# Patient Record
Sex: MALE | Race: BLACK OR AFRICAN AMERICAN | URBAN - METROPOLITAN AREA
[De-identification: names, ages, dates, MRNs, and addresses within clinical notes are randomized per-mention and may not be internally consistent; named-entity substitution may affect disease eponyms.]

---

## 2017-11-22 ENCOUNTER — EMERGENCY (EMERGENCY)
Facility: HOSPITAL | Age: 22
LOS: 1 days | End: 2017-11-22
Attending: EMERGENCY MEDICINE
Payer: SELF-PAY

## 2017-11-22 VITALS
OXYGEN SATURATION: 97 % | DIASTOLIC BLOOD PRESSURE: 86 MMHG | HEART RATE: 88 BPM | SYSTOLIC BLOOD PRESSURE: 140 MMHG | RESPIRATION RATE: 16 BRPM

## 2017-11-22 PROCEDURE — 99285 EMERGENCY DEPT VISIT HI MDM: CPT | Mod: 25

## 2017-11-23 VITALS
HEART RATE: 75 BPM | DIASTOLIC BLOOD PRESSURE: 69 MMHG | TEMPERATURE: 98 F | SYSTOLIC BLOOD PRESSURE: 107 MMHG | OXYGEN SATURATION: 100 % | RESPIRATION RATE: 18 BRPM

## 2017-11-23 PROCEDURE — 90471 IMMUNIZATION ADMIN: CPT

## 2017-11-23 PROCEDURE — 70450 CT HEAD/BRAIN W/O DYE: CPT | Mod: 26

## 2017-11-23 PROCEDURE — G0378: CPT

## 2017-11-23 PROCEDURE — 72125 CT NECK SPINE W/O DYE: CPT | Mod: 26

## 2017-11-23 PROCEDURE — 70486 CT MAXILLOFACIAL W/O DYE: CPT | Mod: 26

## 2017-11-23 PROCEDURE — 99284 EMERGENCY DEPT VISIT MOD MDM: CPT | Mod: 25

## 2017-11-23 PROCEDURE — 70450 CT HEAD/BRAIN W/O DYE: CPT

## 2017-11-23 PROCEDURE — 93005 ELECTROCARDIOGRAM TRACING: CPT

## 2017-11-23 PROCEDURE — 99236 HOSP IP/OBS SAME DATE HI 85: CPT | Mod: 25

## 2017-11-23 PROCEDURE — 90715 TDAP VACCINE 7 YRS/> IM: CPT

## 2017-11-23 PROCEDURE — 70486 CT MAXILLOFACIAL W/O DYE: CPT

## 2017-11-23 PROCEDURE — 72125 CT NECK SPINE W/O DYE: CPT

## 2017-11-23 RX ORDER — CYCLOPENTOLATE HYDROCHLORIDE 10 MG/ML
1 SOLUTION/ DROPS OPHTHALMIC
Qty: 1 | Refills: 0 | OUTPATIENT
Start: 2017-11-23

## 2017-11-23 RX ORDER — OFLOXACIN 0.3 %
1 DROPS OPHTHALMIC (EYE) ONCE
Qty: 0 | Refills: 0 | Status: COMPLETED | OUTPATIENT
Start: 2017-11-23 | End: 2017-11-23

## 2017-11-23 RX ORDER — TETANUS TOXOID, REDUCED DIPHTHERIA TOXOID AND ACELLULAR PERTUSSIS VACCINE, ADSORBED 5; 2.5; 8; 8; 2.5 [IU]/.5ML; [IU]/.5ML; UG/.5ML; UG/.5ML; UG/.5ML
0.5 SUSPENSION INTRAMUSCULAR ONCE
Qty: 0 | Refills: 0 | Status: COMPLETED | OUTPATIENT
Start: 2017-11-23 | End: 2017-11-23

## 2017-11-23 RX ORDER — FLUORESCEIN SODIUM 9 MG
1 STRIP OPHTHALMIC (EYE) ONCE
Qty: 0 | Refills: 0 | Status: COMPLETED | OUTPATIENT
Start: 2017-11-23 | End: 2017-11-23

## 2017-11-23 RX ORDER — PREDNISOLONE SODIUM PHOSPHATE 1 %
1 DROPS OPHTHALMIC (EYE)
Qty: 1 | Refills: 0 | OUTPATIENT
Start: 2017-11-23 | End: 2017-11-25

## 2017-11-23 RX ORDER — ERYTHROMYCIN BASE 5 MG/GRAM
1 OINTMENT (GRAM) OPHTHALMIC (EYE)
Qty: 3 | Refills: 0 | OUTPATIENT
Start: 2017-11-23 | End: 2017-12-03

## 2017-11-23 RX ORDER — OFLOXACIN 0.3 %
1 DROPS OPHTHALMIC (EYE)
Qty: 1 | Refills: 0 | OUTPATIENT
Start: 2017-11-23 | End: 2017-11-28

## 2017-11-23 RX ORDER — OXYCODONE HYDROCHLORIDE 5 MG/1
5 TABLET ORAL ONCE
Qty: 0 | Refills: 0 | Status: DISCONTINUED | OUTPATIENT
Start: 2017-11-23 | End: 2017-11-23

## 2017-11-23 RX ADMIN — TETANUS TOXOID, REDUCED DIPHTHERIA TOXOID AND ACELLULAR PERTUSSIS VACCINE, ADSORBED 0.5 MILLILITER(S): 5; 2.5; 8; 8; 2.5 SUSPENSION INTRAMUSCULAR at 00:33

## 2017-11-23 RX ADMIN — Medication 1 APPLICATION(S): at 03:50

## 2017-11-23 RX ADMIN — OXYCODONE HYDROCHLORIDE 5 MILLIGRAM(S): 5 TABLET ORAL at 01:30

## 2017-11-23 RX ADMIN — OXYCODONE HYDROCHLORIDE 5 MILLIGRAM(S): 5 TABLET ORAL at 00:33

## 2017-11-23 RX ADMIN — Medication 1 DROP(S): at 03:50

## 2017-11-23 RX ADMIN — Medication 1 DROP(S): at 08:40
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
## 2017-11-23 NOTE — ED CDU PROVIDER DISPOSITION NOTE - PLAN OF CARE
1. Follow up with your PMD within 48-72 hours.  2. Follow up with our optho clinic at [phone] tomorrow at 10am at [address] Antibiotics eye drops and other drops as prescribed.  Cold compresses to eye for pain.  NO Aspirin or NSAIDS. Head of the bed elevated. No heavy lifting or sneezing.   3. Worsening pain, new fever, chills, vision changes return to ER.
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
## 2017-11-23 NOTE — ED PROVIDER NOTE - NS ED ROS FT
Constitutional: no fever  Eyes: + conjunctivitis +eyelid swelling  Ears: no ear pain   Nose: no nasal congestion, Mouth/Throat: no throat pain, Neck: no stiffness  Cardiovascular: no chest pain  Chest: no cough  Gastrointestinal: no abdominal pain, no vomiting and diarrhea  MSK: no joint pain, +face pain   : no dysuria  Skin: no rash, +bruising, +laceration  Neuro: no LOC
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
## 2017-11-23 NOTE — ED CDU PROVIDER INITIAL DAY NOTE - PROGRESS NOTE DETAILS
Pt resting comfortably. NAD. VSS, reports b/l eye pain and burning. alondra lens placed with 1L each NS irrigation active. -DAHLIA Rivas Pt resting comfortably. NAD. No complaints. VSS. Pt seen by ophtho pending full evaluation. no complaints; repeat pH 7 and per ophtho no further irrigation. ROCKY Rivas pt seen and evaluated by ophtho and spoke to pt via  to follow up at [facility] tomorrow at 10am and close follow up with PMD for sickle cell workup outpt. Plan discussed with pt, family and Dr. Bragg. ROCKY Rivas
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
## 2017-11-23 NOTE — ED ADULT NURSE REASSESSMENT NOTE - NS ED NURSE REASSESS COMMENT FT1
12 Noon  pt  had opthalmic eval . Continue to monitor  16.00 HRs  Pt was reevaluated by  opthalmic team . Pt was taken back To Eye room . Pt can go home as per opthalmic team .  1700 Pt is Reevaluated by Dr Yemi Coulter Pt is Discharged . Vital signs stable Pt had no N/V/D Fever chills  CP during his stay in ED  Pt is ambulatory stable to go home
Pt continuos to have burning sensation in the Bilateral eyes with redness unable to see the light Bilateral eye irrigations given By DAHLIA Xiong Pt  tolerated the procedure well pt is cleaned  Comfort  measures provided  safety measures initiated  continue to monitor
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
## 2017-11-23 NOTE — ED PROVIDER NOTE - PHYSICAL EXAMINATION
skin- bruising and swelling appreciated over bilateral eyelids, 1 cm laceration over the left upper lip with no active exsanguination; no tenderness appreciated over facial bones

## 2017-11-24 ENCOUNTER — APPOINTMENT (OUTPATIENT)
Dept: OPHTHALMOLOGY | Facility: CLINIC | Age: 22
End: 2017-11-24

## 2017-11-24 PROBLEM — Z00.00 ENCOUNTER FOR PREVENTIVE HEALTH EXAMINATION: Status: ACTIVE | Noted: 2017-11-24

## 2017-11-27 ENCOUNTER — APPOINTMENT (OUTPATIENT)
Dept: OPHTHALMOLOGY | Facility: CLINIC | Age: 22
End: 2017-11-27

## 2017-11-28 ENCOUNTER — APPOINTMENT (OUTPATIENT)
Dept: OPHTHALMOLOGY | Facility: CLINIC | Age: 22
End: 2017-11-28

## 2017-11-30 ENCOUNTER — APPOINTMENT (OUTPATIENT)
Dept: OPHTHALMOLOGY | Facility: CLINIC | Age: 22
End: 2017-11-30

## 2018-11-18 ENCOUNTER — EMERGENCY (EMERGENCY)
Facility: HOSPITAL | Age: 23
LOS: 1 days | Discharge: HOME | End: 2018-11-18
Attending: EMERGENCY MEDICINE
Payer: SELF-PAY

## 2018-11-18 VITALS
RESPIRATION RATE: 16 BRPM | HEIGHT: 70 IN | TEMPERATURE: 98 F | DIASTOLIC BLOOD PRESSURE: 70 MMHG | HEART RATE: 88 BPM | SYSTOLIC BLOOD PRESSURE: 136 MMHG | WEIGHT: 179.9 LBS

## 2018-11-18 PROCEDURE — 99283 EMERGENCY DEPT VISIT LOW MDM: CPT

## 2018-11-18 PROCEDURE — 90715 TDAP VACCINE 7 YRS/> IM: CPT

## 2018-11-18 PROCEDURE — 90471 IMMUNIZATION ADMIN: CPT

## 2018-11-18 PROCEDURE — 73130 X-RAY EXAM OF HAND: CPT | Mod: 26,RT

## 2018-11-18 PROCEDURE — 73130 X-RAY EXAM OF HAND: CPT

## 2018-11-18 PROCEDURE — 99283 EMERGENCY DEPT VISIT LOW MDM: CPT | Mod: 25

## 2018-11-18 RX ORDER — TETANUS TOXOID, REDUCED DIPHTHERIA TOXOID AND ACELLULAR PERTUSSIS VACCINE, ADSORBED 5; 2.5; 8; 8; 2.5 [IU]/.5ML; [IU]/.5ML; UG/.5ML; UG/.5ML; UG/.5ML
0.5 SUSPENSION INTRAMUSCULAR ONCE
Qty: 0 | Refills: 0 | Status: COMPLETED | OUTPATIENT
Start: 2018-11-18 | End: 2018-11-18

## 2018-11-18 RX ORDER — IBUPROFEN 200 MG
400 TABLET ORAL ONCE
Qty: 0 | Refills: 0 | Status: COMPLETED | OUTPATIENT
Start: 2018-11-18 | End: 2018-11-18

## 2018-11-18 RX ADMIN — Medication 1 TABLET(S): at 12:36

## 2018-11-18 RX ADMIN — Medication 400 MILLIGRAM(S): at 12:36

## 2018-11-18 RX ADMIN — TETANUS TOXOID, REDUCED DIPHTHERIA TOXOID AND ACELLULAR PERTUSSIS VACCINE, ADSORBED 0.5 MILLILITER(S): 5; 2.5; 8; 8; 2.5 SUSPENSION INTRAMUSCULAR at 12:36

## 2018-11-18 NOTE — ED PROVIDER NOTE - PHYSICAL EXAMINATION
right upper extremity- No bony tenderness except where noted. +mild tenderness to MCP right 2nd digit. FROM shoulders, elbows, wrists. NVI, good distal pulses x 4 extremities, capillary refill <2 sec BL, sensation intact throughout, 5/5 motor BL. No bruising, no swelling, no warmth, no redness, no discharge, no local signs of infection. No snuffbox tenderness right wrist.

## 2018-11-18 NOTE — ED ADULT TRIAGE NOTE - CHIEF COMPLAINT QUOTE
Patient comes from work and cut himself with metal, minor laceration to the right index finger. Dressing in place and bleeding control @ this time.

## 2018-11-18 NOTE — ED PROVIDER NOTE - OBJECTIVE STATEMENT
22 y/o male presents to ED c/o puncture wound to right 2nd digit mcp area x 1 hour ago. States he was accidentally punctured in his finger by a machine at work. Rates pain 2/10, no radiation of pain, no qualifying factors, sudden onset. States tetanus is not utd. Denies any other complaints. States he otherwise feels good. Denies n/v, f/c, chest pain, sob, numbness, tingling, headache, lightheadedness, dizziness, visual changes.

## 2018-11-18 NOTE — ED PROVIDER NOTE - ATTENDING CONTRIBUTION TO CARE
Creole/English speaking male who is r hand dom was at work today using a drill press and accidently put the drill into the finger #2 r hand causing a small puncture to finger proxmially  he was bib ems for evaluation  no neuro vasc complaints  pt has puncture to finger no apparent lac to repair  not in bone but will xray to eval for fx or fb  trreat pain abx wound care irrigarion referral to hand surgery

## 2018-11-18 NOTE — ED PROVIDER NOTE - MEDICAL DECISION MAKING DETAILS
xray right hand, tdap- risks and benefits discussed prior to administration of tdap, pt agrees and understands. abx, pain management. wound irrigated with ns and povidone iodine. xray right hand, tdap- risks and benefits discussed prior to administration of tdap, pt agrees and understands. abx, pain management. wound irrigated with ns and povidone iodine, clean sterile dressing and bacitracin applied.  Please follow up with your Primary MD in 24-48 hr. Augmentin twice a day x 8 days, take with food. Keep wound clean and dry, daily dressing changes, apply bacitracin. Watch for local signs of infection such as increased redness, warmth, swelling, pain, discharge. Return to ED immediately if condition worsens or if you develop nausea, vomiting, fever, chills, diarrhea.   Seek immediate medical care for any new/worsening signs or symptoms.

## 2020-09-29 NOTE — ED CDU PROVIDER INITIAL DAY NOTE - CONSTITUTIONAL MANNER
Routed to provider for advise.     LOV:  9/28/20    ASSESSMENT AND PLAN:  This is a 38 year old male with:     1) chronic gout-stable   -  Obtain uric acid level and CMP every 3 months   -  Continue Allopurinol 200 mg daily   -  Decrease colchicine 0.6 mg, take 1 tablet daily Monday, Wednesday, Friday and Saturday for 4 weeks, then decrease to 1 tablet daily Monday and Wednesday for 4 weeks, then discontinue.   -  Continue Vitamin C 1000 mg daily      2) Osteoarthritis, 1st carpometacarpal (CMC) joint, left.   -   Continue Voltaren gel apply 2 grams up to four times daily as needed for pain      Follow up in 6 months   appropriate for situation
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
